# Patient Record
Sex: MALE | Race: OTHER | NOT HISPANIC OR LATINO | URBAN - METROPOLITAN AREA
[De-identification: names, ages, dates, MRNs, and addresses within clinical notes are randomized per-mention and may not be internally consistent; named-entity substitution may affect disease eponyms.]

---

## 2024-12-26 ENCOUNTER — EMERGENCY (EMERGENCY)
Facility: HOSPITAL | Age: 44
LOS: 0 days | Discharge: HOME | End: 2024-12-26
Attending: EMERGENCY MEDICINE
Payer: COMMERCIAL

## 2024-12-26 VITALS
HEART RATE: 81 BPM | OXYGEN SATURATION: 99 % | TEMPERATURE: 98 F | WEIGHT: 184.97 LBS | DIASTOLIC BLOOD PRESSURE: 132 MMHG | RESPIRATION RATE: 18 BRPM | SYSTOLIC BLOOD PRESSURE: 182 MMHG

## 2024-12-26 VITALS
TEMPERATURE: 98 F | RESPIRATION RATE: 20 BRPM | SYSTOLIC BLOOD PRESSURE: 179 MMHG | OXYGEN SATURATION: 100 % | DIASTOLIC BLOOD PRESSURE: 99 MMHG | HEART RATE: 73 BPM

## 2024-12-26 DIAGNOSIS — R10.13 EPIGASTRIC PAIN: ICD-10-CM

## 2024-12-26 DIAGNOSIS — F10.91 ALCOHOL USE, UNSPECIFIED, IN REMISSION: ICD-10-CM

## 2024-12-26 LAB
ALBUMIN SERPL ELPH-MCNC: 4.6 G/DL — SIGNIFICANT CHANGE UP (ref 3.5–5.2)
ALP SERPL-CCNC: 72 U/L — SIGNIFICANT CHANGE UP (ref 30–115)
ALT FLD-CCNC: 70 U/L — HIGH (ref 0–41)
ANION GAP SERPL CALC-SCNC: 10 MMOL/L — SIGNIFICANT CHANGE UP (ref 7–14)
AST SERPL-CCNC: 40 U/L — SIGNIFICANT CHANGE UP (ref 0–41)
BASOPHILS # BLD AUTO: 0.04 K/UL — SIGNIFICANT CHANGE UP (ref 0–0.2)
BASOPHILS NFR BLD AUTO: 0.4 % — SIGNIFICANT CHANGE UP (ref 0–1)
BILIRUB SERPL-MCNC: 0.6 MG/DL — SIGNIFICANT CHANGE UP (ref 0.2–1.2)
BUN SERPL-MCNC: 9 MG/DL — LOW (ref 10–20)
CALCIUM SERPL-MCNC: 9.8 MG/DL — SIGNIFICANT CHANGE UP (ref 8.4–10.5)
CHLORIDE SERPL-SCNC: 98 MMOL/L — SIGNIFICANT CHANGE UP (ref 98–110)
CO2 SERPL-SCNC: 28 MMOL/L — SIGNIFICANT CHANGE UP (ref 17–32)
CREAT SERPL-MCNC: 1 MG/DL — SIGNIFICANT CHANGE UP (ref 0.7–1.5)
EGFR: 96 ML/MIN/1.73M2 — SIGNIFICANT CHANGE UP
EOSINOPHIL # BLD AUTO: 0.23 K/UL — SIGNIFICANT CHANGE UP (ref 0–0.7)
EOSINOPHIL NFR BLD AUTO: 2.6 % — SIGNIFICANT CHANGE UP (ref 0–8)
GLUCOSE SERPL-MCNC: 90 MG/DL — SIGNIFICANT CHANGE UP (ref 70–99)
HCT VFR BLD CALC: 46 % — SIGNIFICANT CHANGE UP (ref 42–52)
HGB BLD-MCNC: 16.4 G/DL — SIGNIFICANT CHANGE UP (ref 14–18)
IMM GRANULOCYTES NFR BLD AUTO: 0.3 % — SIGNIFICANT CHANGE UP (ref 0.1–0.3)
LIDOCAIN IGE QN: 423 U/L — HIGH (ref 7–60)
LYMPHOCYTES # BLD AUTO: 1.98 K/UL — SIGNIFICANT CHANGE UP (ref 1.2–3.4)
LYMPHOCYTES # BLD AUTO: 22.2 % — SIGNIFICANT CHANGE UP (ref 20.5–51.1)
MCHC RBC-ENTMCNC: 32.9 PG — HIGH (ref 27–31)
MCHC RBC-ENTMCNC: 35.7 G/DL — SIGNIFICANT CHANGE UP (ref 32–37)
MCV RBC AUTO: 92.4 FL — SIGNIFICANT CHANGE UP (ref 80–94)
MONOCYTES # BLD AUTO: 0.76 K/UL — HIGH (ref 0.1–0.6)
MONOCYTES NFR BLD AUTO: 8.5 % — SIGNIFICANT CHANGE UP (ref 1.7–9.3)
NEUTROPHILS # BLD AUTO: 5.86 K/UL — SIGNIFICANT CHANGE UP (ref 1.4–6.5)
NEUTROPHILS NFR BLD AUTO: 66 % — SIGNIFICANT CHANGE UP (ref 42.2–75.2)
NRBC # BLD: 0 /100 WBCS — SIGNIFICANT CHANGE UP (ref 0–0)
PLATELET # BLD AUTO: 272 K/UL — SIGNIFICANT CHANGE UP (ref 130–400)
PMV BLD: 8.8 FL — SIGNIFICANT CHANGE UP (ref 7.4–10.4)
POTASSIUM SERPL-MCNC: 5 MMOL/L — SIGNIFICANT CHANGE UP (ref 3.5–5)
POTASSIUM SERPL-SCNC: 5 MMOL/L — SIGNIFICANT CHANGE UP (ref 3.5–5)
PROT SERPL-MCNC: 7.3 G/DL — SIGNIFICANT CHANGE UP (ref 6–8)
RBC # BLD: 4.98 M/UL — SIGNIFICANT CHANGE UP (ref 4.7–6.1)
RBC # FLD: 12.3 % — SIGNIFICANT CHANGE UP (ref 11.5–14.5)
SODIUM SERPL-SCNC: 136 MMOL/L — SIGNIFICANT CHANGE UP (ref 135–146)
WBC # BLD: 8.9 K/UL — SIGNIFICANT CHANGE UP (ref 4.8–10.8)
WBC # FLD AUTO: 8.9 K/UL — SIGNIFICANT CHANGE UP (ref 4.8–10.8)

## 2024-12-26 PROCEDURE — 76705 ECHO EXAM OF ABDOMEN: CPT

## 2024-12-26 PROCEDURE — 80053 COMPREHEN METABOLIC PANEL: CPT

## 2024-12-26 PROCEDURE — 96374 THER/PROPH/DIAG INJ IV PUSH: CPT

## 2024-12-26 PROCEDURE — 85025 COMPLETE CBC W/AUTO DIFF WBC: CPT

## 2024-12-26 PROCEDURE — 93010 ELECTROCARDIOGRAM REPORT: CPT | Mod: 76

## 2024-12-26 PROCEDURE — 83690 ASSAY OF LIPASE: CPT

## 2024-12-26 PROCEDURE — 76705 ECHO EXAM OF ABDOMEN: CPT | Mod: 26

## 2024-12-26 PROCEDURE — 96375 TX/PRO/DX INJ NEW DRUG ADDON: CPT

## 2024-12-26 PROCEDURE — 93005 ELECTROCARDIOGRAM TRACING: CPT

## 2024-12-26 PROCEDURE — 99285 EMERGENCY DEPT VISIT HI MDM: CPT | Mod: 25

## 2024-12-26 PROCEDURE — 99284 EMERGENCY DEPT VISIT MOD MDM: CPT

## 2024-12-26 RX ORDER — FAMOTIDINE 20 MG/1
20 TABLET, FILM COATED ORAL ONCE
Refills: 0 | Status: COMPLETED | OUTPATIENT
Start: 2024-12-26 | End: 2024-12-26

## 2024-12-26 RX ORDER — IBUPROFEN 200 MG
1 TABLET ORAL
Qty: 12 | Refills: 0
Start: 2024-12-26 | End: 2024-12-28

## 2024-12-26 RX ORDER — 0.9 % SODIUM CHLORIDE 0.9 %
1000 INTRAVENOUS SOLUTION INTRAVENOUS ONCE
Refills: 0 | Status: COMPLETED | OUTPATIENT
Start: 2024-12-26 | End: 2024-12-26

## 2024-12-26 RX ORDER — KETOROLAC TROMETHAMINE 30 MG/ML
15 INJECTION INTRAMUSCULAR; INTRAVENOUS ONCE
Refills: 0 | Status: DISCONTINUED | OUTPATIENT
Start: 2024-12-26 | End: 2024-12-26

## 2024-12-26 RX ADMIN — FAMOTIDINE 20 MILLIGRAM(S): 20 TABLET, FILM COATED ORAL at 12:56

## 2024-12-26 RX ADMIN — KETOROLAC TROMETHAMINE 15 MILLIGRAM(S): 30 INJECTION INTRAMUSCULAR; INTRAVENOUS at 14:43

## 2024-12-26 RX ADMIN — Medication 1000 MILLILITER(S): at 12:57

## 2024-12-26 NOTE — ED PROVIDER NOTE - NSFOLLOWUPINSTRUCTIONS_ED_ALL_ED_FT
Pancreatitis    WHAT YOU NEED TO KNOW:    Pancreatitis is inflammation of your pancreas. The pancreas is an organ that makes insulin. It also makes enzymes (digestive juices) that help your body digest food. Pancreatitis may be an acute (short-term) problem that happens only once. It may become a chronic (long-term) problem that comes and goes over time.  Pancreas    DISCHARGE INSTRUCTIONS:    Call your doctor if:    You have severe pain in your abdomen and you are vomiting.    You have a fever.    You continue to lose weight without trying.    Your skin or the whites of your eyes turn yellow.    You have questions or concerns about your condition or care.  Medicines: You may need any of the following:    Prescription pain medicine may be given. Ask your healthcare provider how to take this medicine safely. Some prescription pain medicines contain acetaminophen. Do not take other medicines that contain acetaminophen without talking to your healthcare provider. Too much acetaminophen may cause liver damage. Prescription pain medicine may cause constipation. Ask your healthcare provider how to prevent or treat constipation.    Antibiotics may be given to treat a bacterial infection.    Take your medicine as directed. Contact your healthcare provider if you think your medicine is not helping or if you have side effects. Tell your provider if you are allergic to any medicine. Keep a list of the medicines, vitamins, and herbs you take. Include the amounts, and when and why you take them. Bring the list or the pill bottles to follow-up visits. Carry your medicine list with you in case of an emergency.  Self-care:    Rest when you feel it is needed. Slowly start to do more each day. Return to your usual activities as directed.    Do not drink any alcohol. If you need help to stop drinking, contact the following organization:  Alcoholics Anonymous  Web Address: http://www.aa.org      Ask your healthcare provider or dietitian about the best foods to eat. You may need to eat foods that are low in fat if you have chronic pancreatitis.  Low-Fat Diet      Do not smoke. Nicotine and other chemicals in cigarettes and cigars can cause damage. Ask your healthcare provider for information if you currently smoke and need help to quit. E-cigarettes or smokeless tobacco still contain nicotine. Talk to your healthcare provider before you use these products.  Follow up with your doctor as directed: Write down your questions so you remember to ask them during your visits.

## 2024-12-26 NOTE — ED PROVIDER NOTE - CLINICAL SUMMARY MEDICAL DECISION MAKING FREE TEXT BOX
Barbra Villagran 1960 11/13/2017 Dear Janee Aguirre MD 
 
I had the pleasure of evaluating  Ms. Jaffe in office today. Below are the relevant portions of my assessment and plan of care. ICD-10-CM ICD-9-CM 1. Chest pain, unspecified type R07.9 786.50 SCHEDULE NUCLEAR STUDY ? angina 
abn nuc once recent er visit 2. Hyperlipidemia, unspecified hyperlipidemia type E78.5 272.4   
 stable 3. PVC (premature ventricular contraction) I49.3 427.69 SCHEDULE NUCLEAR STUDY  
   2D ECHO COMPLETE ADULT (TTE)  
   ECG,PT DEMAND EVENT,PRESYMPT MEMORY LOOP  
 stable 4. PFO (patent foramen ovale) Q21.1 745. 5 2D ECHO COMPLETE ADULT (TTE)  
 old 5. Near syncope R55 780.2 ECG,PT DEMAND EVENT,PRESYMPT MEMORY LOOP  
 2 episode on exertion and at rest  
 
 
Current Outpatient Prescriptions Medication Sig Dispense Refill  mometasone (ASMANEX TWISTHALER) 220 mcg (120 doses) aepb inhaler Take 1 Puff by inhalation daily.  loratadine (CLARITIN) 10 mg tablet Take 10 mg by mouth.  magnesium 250 mg tab Take 250 mg by mouth.  aspirin delayed-release 81 mg tablet Take 81 mg by mouth daily.  cyanocobalamin (VITAMIN B-12) 1,000 mcg tablet Take 1,000 mcg by mouth daily.  OTHER chromax plus take one cap every day  multivitamin (ONE A DAY) tablet Take 1 Tab by mouth daily.  OMEGA-3 FATTY ACIDS/FISH OIL (FISH OIL EXTRA STRENGTH PO) Take  by mouth daily.  nitroglycerin (NITROSTAT) 0.4 mg SL tablet 1 Tab by SubLINGual route every five (5) minutes as needed for Chest Pain. 25 Tab 1  Cholecalciferol, Vitamin D3, 1,000 unit cap Take  by mouth.  albuterol (PROVENTIL HFA, VENTOLIN HFA, PROAIR HFA) 90 mcg/actuation inhaler Take  by inhalation.  zinc 50 mg Tab Take  by mouth daily. Orders Placed This Encounter  ECG,PT DEMAND EVENT,PRESYMPT MEMORY LOOP  
 SCHEDULE NUCLEAR STUDY Exercise stress test  
  Standing Status:   Future Standing Expiration Date:   11/13/2018  2D ECHO COMPLETE ADULT (TTE) Standing Status:   Future Standing Expiration Date:   5/12/2018 Order Specific Question:   Reason for Exam: Answer:   see diagnosis  mometasone (ASMANEX TWISTHALER) 220 mcg (120 doses) aepb inhaler Sig: Take 1 Puff by inhalation daily.  loratadine (CLARITIN) 10 mg tablet Sig: Take 10 mg by mouth.  magnesium 250 mg tab Sig: Take 250 mg by mouth.  aspirin delayed-release 81 mg tablet Sig: Take 81 mg by mouth daily.  cyanocobalamin (VITAMIN B-12) 1,000 mcg tablet Sig: Take 1,000 mcg by mouth daily.  OTHER Sig: chromax plus take one cap every day If you have questions, please do not hesitate to call me. I look forward to following Ms. Jaffe along with you. Sincerely, Dillan Meadows MD 
 
 45 yo man w/ no pmhx here w/ epigastric/RUQ pain X 3 days  intermittant and improves w/ ibuprofen  increased w/ food  no radiation, no sob, poritllo, palpitations or exertional symptoms  no hx of similar in the past  no leg swelling  no sudden/max/severe onset of pain    wd/wn  nad  rrr s1s2 wnl  cta b/l  RUQ ttp w/o rebound/guarding  aao X 3  gait stable    ECG: NSR w/ tall t waves in V2-4. no other sichemic chagnes    Pt symkptoms inconsistent w/ ACS but ECG concerning. Repeated 30 min later w/ no changes making hyperacute t-waves highly unlikely. Given history and physical, inconsistent w/ ACS.     45 yo man w/ concern for biliary pathology, pancreatitis or gastritis  RUQ US labs and reassess 43 yo man w/ no pmhx here w/ epigastric/RUQ pain X 3 days  intermittant and improves w/ ibuprofen  increased w/ food  no radiation, no sob, portillo, palpitations or exertional symptoms  no hx of similar in the past  no leg swelling  no sudden/max/severe onset of pain    wd/wn  nad  rrr s1s2 wnl  cta b/l  RUQ ttp w/o rebound/guarding  aao X 3  gait stable    ECG: NSR w/ tall t waves in V2-4. no other sichemic chagnes    Pt symkptoms inconsistent w/ ACS but ECG concerning. Repeated 30 min later w/ no changes making hyperacute t-waves highly unlikely. Given history and physical, inconsistent w/ ACS.     43 yo man w/ concern for biliary pathology, pancreatitis or gastritis  RUQ US labs and reassess    1800: pt lam PO w/o diff. pain controlled w/ NSAIDs. US w/o GB pathology. Likely secondary to drinking as endorses daily drinking. Given exam, pt stable for outpatient pancreatitis management. Discussed w/ patient and recommend f/u w/ PCP and GI if necessary

## 2024-12-26 NOTE — ED PROVIDER NOTE - HIV OFFER
Patient notified requesting to have MRI done at [facility] in 6 months. Notes placed for [facility]. Opt out

## 2024-12-26 NOTE — ED PROVIDER NOTE - OBJECTIVE STATEMENT
44-year-old male with past medical history of alcohol use (3-4 beers a day) who presents for burning epigastric abdominal pain that radiates to the right upper quadrant x 3 days.  Pain is relieved with ibuprofen and eating food.  Denies associated fevers, chills, headache, vision changes, chest pain, shortness breath, nausea, vomiting, diarrhea, constipation, dysuria, penile discharge, weakness, numbness, trauma, recent travel, new foods.  Denies tobacco use, substance use.  No prior history of abdominal surgeries.

## 2024-12-26 NOTE — ED PROVIDER NOTE - PRO INTERPRETER NEED 2
[FreeTextEntry1] : Labs reviewed with patient during this encounter.  A1C = 6.2 from 6.9  Patient to continue with present medications - all medications reconciled/reviewed during this visit and listed above Patient to start Vitamin therapy as discussed during visit Increase fluid intake..  RTO for repeat labs in 3 months.  RTO in 3 months for re-evaluation.  Diet teaching for at least 8 minutes.performed in depth during this visit related to cholesterol and cardiovascular risks. At least 25 minutes was spent with patient via video conference reviewing findings/results during this visit. Ample time was provided to answer any questions or address concerns to the patients satisfaction..  Patient was advised that this audiovisual encounter constitutes a billable visit, and that the visit will be billed on the same terms and conditions as an in-office, face-to-face visit. Patient was further advised they may be responsible for any co-payment, co-insurance, or other self-payment they would normally pay for an office visit, unless such self-payment was waived by their insurance carrier.   English

## 2024-12-26 NOTE — ED PROVIDER NOTE - NSFOLLOWUPCLINICS_GEN_ALL_ED_FT
[facility]  Medicine  [address], [place]   Phone: [phone]  Fax:   Follow Up Time: Routine    [facility] ([facility])  Rehabilitation  [address] [place] [address]  Phone: [phone]  Fax:   Follow Up Time: Routine

## 2024-12-26 NOTE — ED PROVIDER NOTE - PHYSICAL EXAMINATION
VITAL SIGNS: I have reviewed nursing notes and confirm.  CONSTITUTIONAL: well-appearing, non-toxic, NAD  SKIN: Warm dry, normal skin turgor, no acute rash, no bruising  HEAD: NCAT  EYES: EOMI, PERRLA, no scleral icterus, normal conjunctiva  ENT: Moist mucous membranes, OR clear. Normal pharynx  NECK: Supple; non tender. Full ROM. No cervical LAD  CARD: RRR, no murmurs, rubs or gallops  RESP: clear to ausculation b/l.  No rales, rhonchi, or wheezing. No increased WOB.  ABD: soft, + BS, epigastric and RUQ tender, non-distended, no rebound or guarding. No CVA tenderness  EXT: Full ROM, no bony tenderness, pulses intact in bilateral UE and LE, no pedal edema, no calf tenderness  NEURO: normal motor. normal sensory. Normal gait.  PSYCH: Cooperative, appropriate.

## 2024-12-26 NOTE — ED PROVIDER NOTE - PATIENT PORTAL LINK FT
You can access the FollowMyHealth Patient Portal offered by [facility] by registering at the following website: [email or web address]. By joining [facility]’s FollowMyHealth portal, you will also be able to view your health information using other applications (apps) compatible with our system.
